# Patient Record
Sex: FEMALE | Race: WHITE | NOT HISPANIC OR LATINO | Employment: STUDENT | URBAN - METROPOLITAN AREA
[De-identification: names, ages, dates, MRNs, and addresses within clinical notes are randomized per-mention and may not be internally consistent; named-entity substitution may affect disease eponyms.]

---
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
## 2021-04-29 ENCOUNTER — PATIENT MESSAGE (OUTPATIENT)
Dept: RESEARCH | Facility: HOSPITAL | Age: 21
End: 2021-04-29

## 2022-08-05 ENCOUNTER — HOSPITAL ENCOUNTER (EMERGENCY)
Facility: HOSPITAL | Age: 22
Discharge: HOME | End: 2022-08-05
Attending: EMERGENCY MEDICINE
Payer: MEDICAID

## 2022-08-05 VITALS
SYSTOLIC BLOOD PRESSURE: 123 MMHG | RESPIRATION RATE: 20 BRPM | TEMPERATURE: 98 F | OXYGEN SATURATION: 98 % | HEART RATE: 89 BPM | WEIGHT: 116.63 LBS | DIASTOLIC BLOOD PRESSURE: 72 MMHG | BODY MASS INDEX: 22.02 KG/M2 | HEIGHT: 61 IN

## 2022-08-05 DIAGNOSIS — E87.5 HYPERKALEMIA: ICD-10-CM

## 2022-08-05 DIAGNOSIS — R89.9 ABNORMAL LABORATORY TEST: Primary | ICD-10-CM

## 2022-08-05 LAB — POTASSIUM SERPL-SCNC: 3.8 MMOL/L (ref 3.5–5.1)

## 2022-08-05 PROCEDURE — 84132 ASSAY OF SERUM POTASSIUM: CPT | Performed by: EMERGENCY MEDICINE

## 2022-08-05 PROCEDURE — 93005 ELECTROCARDIOGRAM TRACING: CPT

## 2022-08-05 PROCEDURE — 99284 EMERGENCY DEPT VISIT MOD MDM: CPT

## 2022-08-05 PROCEDURE — 93010 ELECTROCARDIOGRAM REPORT: CPT | Mod: ,,, | Performed by: INTERNAL MEDICINE

## 2022-08-05 PROCEDURE — 93010 EKG 12-LEAD: ICD-10-PCS | Mod: ,,, | Performed by: INTERNAL MEDICINE

## 2022-08-05 NOTE — ED PROVIDER NOTES
SCRIBE #1 NOTE: I, Nick Mcgrath, am scribing for, and in the presence of, Ian Clark MD. I have scribed the entire note.       History     Chief Complaint   Patient presents with    Abnormal Lab     Critical Potassium of 8.2 per lab results, sent for eval and treat, denies complaint     Review of patient's allergies indicates:  No Known Allergies      History of Present Illness     HPI    8/5/2022, 6:27 PM  History obtained from the patient      History of Present Illness: Dasha Villalobos is a 21 y.o. female patient who presents to the Emergency Department for evaluation of high Potassium level at 8.2 which onset suddenly on Wednesday. Today she got her results back and was sent to the ER. Patient is asymptomatic.  Patient had lab work done for a routine checkup.  Patient denies any CP, weakness, palpitations and all other sxs at this time. No further complaints or concerns at this time.       Arrival mode: Personal vehicle      PCP: Jovanny Mann MD        Past Medical History:  No past medical history on file.    Past Surgical History:  Past Surgical History:   Procedure Laterality Date    NO PAST SURGERIES           Family History:  No family history on file.    Social History:  Social History     Tobacco Use    Smoking status: Never Smoker    Smokeless tobacco: Never Used   Substance and Sexual Activity    Alcohol use: No    Drug use: No    Sexual activity: Not on file        Review of Systems     Review of Systems   Constitutional: Negative for chills and fever.   HENT: Negative for congestion and sore throat.    Eyes: Negative for pain.   Respiratory: Negative for cough and shortness of breath.    Cardiovascular: Negative for chest pain.   Gastrointestinal: Negative for diarrhea, nausea and vomiting.   Genitourinary: Negative for dysuria and flank pain.   Musculoskeletal: Negative for back pain and neck pain.   Skin: Negative for rash.   Neurological: Negative for weakness and numbness.  "  Hematological: Does not bruise/bleed easily.      Physical Exam     Initial Vitals [08/05/22 1755]   BP Pulse Resp Temp SpO2   134/85 90 16 98.2 °F (36.8 °C) 98 %      MAP       --          Physical Exam  Nursing Notes and Vital Signs Reviewed.  Constitutional: Patient is in no apparent distress. Well-developed and well-nourished.  Head: Atraumatic. Normocephalic.  Eyes: PERRL. EOM intact. Conjunctivae are not pale. No scleral icterus.  ENT: Mucous membranes are moist. Oropharynx is clear and symmetric.    Neck: Supple. Full ROM. No lymphadenopathy.  Cardiovascular: Regular rate. Regular rhythm. No murmurs, rubs, or gallops. Distal pulses are 2+ and symmetric.  Pulmonary/Chest: No respiratory distress. Clear to auscultation bilaterally. No wheezing or rales.  Abdominal: Soft and non-distended.  There is no tenderness.  No rebound, guarding, or rigidity. Good bowel sounds.  Genitourinary: No CVA tenderness  Musculoskeletal: Moves all extremities. No obvious deformities. No edema. No calf tenderness.  Skin: Warm and dry.  Neurological:  Alert, awake, and appropriate.  Normal speech.  No acute focal neurological deficits are appreciated.  Psychiatric: Normal affect. Good eye contact. Appropriate in content.     ED Course   Procedures  ED Vital Signs:  Vitals:    08/05/22 1755   BP: 134/85   Pulse: 90   Resp: 16   Temp: 98.2 °F (36.8 °C)   TempSrc: Oral   SpO2: 98%   Weight: 52.9 kg (116 lb 10 oz)   Height: 5' 1" (1.549 m)       Abnormal Lab Results:  Labs Reviewed   POTASSIUM        All Lab Results:  Results for orders placed or performed during the hospital encounter of 08/05/22   Potassium   Result Value Ref Range    Potassium 3.8 3.5 - 5.1 mmol/L       The EKG was ordered, reviewed, and independently interpreted by the ED provider.  Interpretation time: 18:10:44  Rate: 86 BPM  Rhythm: normal sinus rhythm  Interpretation: No acute ST eleations. No STEMI.             The Emergency Provider reviewed the vital signs " and test results, which are outlined above.     ED Discussion     7:21 PM: Reassessed pt at this time.  Discussed with pt all pertinent ED information and results. Discussed pt dx and plan of tx. Gave pt all f/u and return to the ED instructions. All questions and concerns were addressed at this time. Pt expresses understanding of information and instructions, and is comfortable with plan to discharge. Pt is stable for discharge.    I discussed with patient and/or family/caretaker that evaluation in the ED does not suggest any emergent or life threatening medical conditions requiring immediate intervention beyond what was provided in the ED, and I believe patient is safe for discharge.  Regardless, an unremarkable evaluation in the ED does not preclude the development or presence of a serious of life threatening condition. As such, patient was instructed to return immediately for any worsening or change in current symptoms.         Medical Decision Making:   Clinical Tests:   Lab Tests: Ordered and Reviewed  Medical Tests: Ordered and Reviewed  Pseudohyperkalemia.  Patient had routine lab work done and potassium came back elevated..  She is asymptomatic.  Repeat potassium in the emergency department is within normal limits.  Patient discharged home stable asymptomatic condition           ED Medication(s):  Medications - No data to display    New Prescriptions    No medications on file        Follow-up Information     [facility] - Emergency Dept..    Specialty: Emergency Medicine  Why: As needed, If symptoms worsen  Contact information:  [address] [place] [id number] [phone]           Follow-up with primary care physician as needed.                           Scribe Attestation:   Scribe #1: I performed the above scribed service and the documentation accurately describes the services I performed. I attest to the accuracy of the note.     Attending:   Physician Attestation Statement for  Scribe #1: I, Ian Clark MD, personally performed the services described in this documentation, as scribed by Nick Mcgrath, in my presence, and it is both accurate and complete.           Clinical Impression       ICD-10-CM ICD-9-CM   1. Abnormal laboratory test  R89.9 796.4   2. Hyperkalemia  E87.5 276.7       Disposition:   Disposition: Discharged  Condition: Stable         Ian Clark MD  08/08/22 0791

## 2023-01-13 ENCOUNTER — HOSPITAL ENCOUNTER (OUTPATIENT)
Dept: RADIOLOGY | Facility: HOSPITAL | Age: 23
Discharge: HOME | End: 2023-01-13
Attending: NURSE PRACTITIONER
Payer: MEDICAID

## 2023-01-13 DIAGNOSIS — N63.0 BREAST NODULE: ICD-10-CM

## 2023-01-13 PROCEDURE — 76642 US BREAST RIGHT LIMITED: ICD-10-PCS | Mod: 26,RT,, | Performed by: RADIOLOGY

## 2023-01-13 PROCEDURE — 76642 ULTRASOUND BREAST LIMITED: CPT | Mod: TC,RT

## 2023-01-13 PROCEDURE — 76642 ULTRASOUND BREAST LIMITED: CPT | Mod: 26,RT,, | Performed by: RADIOLOGY

## 2023-02-07 ENCOUNTER — HOSPITAL ENCOUNTER (EMERGENCY)
Facility: HOSPITAL | Age: 23
Discharge: HOME | End: 2023-02-07
Attending: EMERGENCY MEDICINE
Payer: MEDICAID

## 2023-02-07 VITALS
HEART RATE: 81 BPM | SYSTOLIC BLOOD PRESSURE: 156 MMHG | WEIGHT: 120.69 LBS | DIASTOLIC BLOOD PRESSURE: 91 MMHG | TEMPERATURE: 99 F | OXYGEN SATURATION: 98 % | RESPIRATION RATE: 20 BRPM | BODY MASS INDEX: 22.78 KG/M2 | HEIGHT: 61 IN

## 2023-02-07 DIAGNOSIS — H65.91 RIGHT NON-SUPPURATIVE OTITIS MEDIA: Primary | ICD-10-CM

## 2023-02-07 DIAGNOSIS — H92.03 EAR PAIN, BILATERAL: ICD-10-CM

## 2023-02-07 PROCEDURE — 99284 EMERGENCY DEPT VISIT MOD MDM: CPT

## 2023-02-07 RX ORDER — CETIRIZINE HYDROCHLORIDE 10 MG/1
10 TABLET ORAL DAILY
Qty: 30 TABLET | Refills: 0 | Status: SHIPPED | OUTPATIENT
Start: 2023-02-07 | End: 2024-02-07

## 2023-02-07 RX ORDER — TRAMADOL HYDROCHLORIDE 50 MG/1
50 TABLET ORAL EVERY 6 HOURS PRN
Qty: 12 TABLET | Refills: 0 | Status: SHIPPED | OUTPATIENT
Start: 2023-02-07

## 2023-02-07 RX ORDER — AMOXICILLIN 500 MG/1
500 CAPSULE ORAL 3 TIMES DAILY
Qty: 21 CAPSULE | Refills: 0 | Status: SHIPPED | OUTPATIENT
Start: 2023-02-07 | End: 2023-02-14

## 2023-02-07 NOTE — ED PROVIDER NOTES
SCRIBE #1 NOTE: I, Denver Estes, am scribing for, and in the presence of, Perfecto Rodriguez MD. I have scribed the entire note.      History      Chief Complaint   Patient presents with    Otalgia     Since October; worsening pain; needs referral for ENT       Review of patient's allergies indicates:  No Known Allergies     HPI   HPI    2/7/2023, 9:33 AM   History obtained from the patient      History of Present Illness: Dasha Villalobos is a 22 y.o. female patient who presents to the Emergency Department for R ear pain, onset 5 months PTA. 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 Pt was unable to get an appointment with her PCP, and was thus referred to the ED for further evaluation. Symptoms are constant and moderate in severity. No mitigating or exacerbating factors reported. No associated sxs reported. Patient denies any fever, chills, n/v/d, SOB, CP, weakness, numbness, dizziness, headache, and all other sxs at this time.   Pt has been treated with 2 rounds of abx over the past 5 months. No further complaints or concerns at this time.     Arrival mode: Personal vehicle    PCP: Jovanny Mann MD       Past Medical History:  History reviewed. No pertinent past medical history.    Past Surgical History:  Past Surgical History:   Procedure Laterality Date    NO PAST SURGERIES           Family History:  Family History   Problem Relation Age of Onset    Diabetes Mother     Hypertension Mother        Social History:  Social History     Tobacco Use    Smoking status: Never    Smokeless tobacco: Never   Substance and Sexual Activity    Alcohol use: No    Drug use: No    Sexual activity: Not on file       ROS   Review of Systems   Constitutional:  Negative for chills and fever.   HENT:  Positive for ear pain (R). Negative for sore throat.    Respiratory:  Negative for shortness of breath.    Cardiovascular:  Negative for chest pain.   Gastrointestinal:  Negative for diarrhea, nausea and vomiting.  "  Genitourinary:  Negative for dysuria.   Musculoskeletal:  Negative for back pain.   Skin:  Negative for rash.   Neurological:  Negative for dizziness, weakness, light-headedness, numbness and headaches.   Hematological:  Does not bruise/bleed easily.   All other systems reviewed and are negative.    Physical Exam      Initial Vitals [02/07/23 0912]   BP Pulse Resp Temp SpO2   (!) 156/91 81 20 98.7 °F (37.1 °C) 98 %      MAP       --          Physical Exam  Nursing Notes and Vital Signs Reviewed.  Constitutional: Patient is in no acute distress. Well-developed and well-nourished.  Head: Atraumatic. Normocephalic.  Eyes: PERRL. EOM intact. Conjunctivae are not pale. No scleral icterus.  ENT: Mucous membranes are moist. Oropharynx is clear and symmetric.  Bilateral TM erythema and bulging, no pus noted.  Neck: Supple. Full ROM. Bilateral anterior cervical lymphadenopathy.  Cardiovascular: Regular rate. Regular rhythm. No murmurs, rubs, or gallops. Distal pulses are 2+ and symmetric.  Pulmonary/Chest: No respiratory distress. Clear to auscultation bilaterally. No wheezing or rales.  Abdominal: Soft and non-distended.  There is no tenderness.  No rebound, guarding, or rigidity.  Musculoskeletal: Moves all extremities. No obvious deformities. No edema.  Skin: Warm and dry.  Neurological:  Alert, awake, and appropriate.  Normal speech.  No acute focal neurological deficits are appreciated.  Psychiatric: Normal affect. Good eye contact. Appropriate in content.    ED Course    Procedures  ED Vital Signs:  Vitals:    02/07/23 0912   BP: (!) 156/91   Pulse: 81   Resp: 20   Temp: 98.7 °F (37.1 °C)   TempSrc: Oral   SpO2: 98%   Weight: 54.8 kg (120 lb 11.2 oz)   Height: 5' 1" (1.549 m)       Abnormal Lab Results:  Labs Reviewed - No data to display     Imaging Results:  Imaging Results    None                 The Emergency Provider reviewed the vital signs and test results, which are outlined above.    ED Discussion     9:36 " AM: Reassessed pt at this time. Discussed with pt all pertinent ED information. Discussed pt dx and plan of tx. Gave pt all f/u and return to the ED instructions. All questions and concerns were addressed at this time. Pt expresses understanding of information and instructions, and is comfortable with plan to discharge. Pt is stable for discharge.    I discussed with patient that evaluation in the ED does not suggest any emergent or life threatening medical conditions requiring immediate intervention beyond what was provided in the ED, and I believe patient is safe for discharge.  Regardless, an unremarkable evaluation in the ED does not preclude the development or presence of a serious of life threatening condition. As such, patient was instructed to return immediately for any worsening or change in current symptoms.         ED Medication(s):  Medications - No data to display     Follow-up Information       Jovanny Mann MD. Schedule an appointment as soon as possible for a visit in 3 days.    Specialty: Family Medicine  Contact information:  [address] [facility]  [facility] [address]  [phone]                           Discharge Medication List as of 2/7/2023  9:41 AM        START taking these medications    Details   amoxicillin (AMOXIL) 500 MG capsule Take 1 capsule (500 mg total) by mouth 3 (three) times daily. for 7 days, Starting Tue 2/7/2023, Until Tue 2/14/2023, Normal      cetirizine (ZYRTEC) 10 MG tablet Take 1 tablet (10 mg total) by mouth once daily., Starting Tue 2/7/2023, Until Wed 2/7/2024, Normal      traMADoL (ULTRAM) 50 mg tablet Take 1 tablet (50 mg total) by mouth every 6 (six) hours as needed for Pain., Starting Tue 2/7/2023, Normal               Medical Decision Making          Medical Decision Making  Problems Addressed:  Right non-suppurative otitis media: undiagnosed new problem with uncertain prognosis    Risk  OTC drugs.  Prescription drug management.          Scribe Attestation:   Scribe #1: I performed the above scribed service and the documentation accurately describes the services I performed. I attest to the accuracy of the note.    Attending:   Physician Attestation Statement for Scribe #1: I, Perfecto Rodriguez MD, personally performed the services described in this documentation, as scribed by Denver Estes, in my presence, and it is both accurate and complete.          Clinical Impression       ICD-10-CM ICD-9-CM   1. Right non-suppurative otitis media  H65.91 381.4   2. Ear pain, bilateral  H92.03 388.70       Disposition:   Disposition: Discharged  Condition: Stable       Perfecto Rodriguez MD  02/07/23 2143       Perfecto Rodriguez MD  02/07/23 2143

## 2023-02-07 NOTE — Clinical Note
"Dasha Mcallister" Griselda was seen and treated in our emergency department on 2/7/2023.  She may return to work on 02/08/2023.       If you have any questions or concerns, please don't hesitate to call.      Perfecto Rodriguez MD"

## 2023-02-07 NOTE — DISCHARGE INSTRUCTIONS
Follow-up with your primary care doctor the next 2-3 days for recheck and possible ear nose and throat doctor referral.  Return to the emergency department for any worsening signs or symptoms.

## 2023-02-13 ENCOUNTER — TELEPHONE (OUTPATIENT)
Dept: OTOLARYNGOLOGY | Facility: CLINIC | Age: 23
End: 2023-02-13
Payer: MEDICAID

## 2023-02-13 NOTE — TELEPHONE ENCOUNTER
----- Message from Bere Sanders sent at 2/13/2023 12:01 PM CST -----  Contact: CANDICE TRIPP [[id number]] [phone]  Type: Appointment Request    Name of Caller: CANDICE TRIPP [[id number]]  When is the first available appointment? Do not have access  Reason for Visit:  Right non-suppurative otitis media [H65.91], referral  Best Call Back Number: [phone]  Additional Information: New Medicaid patient, has referral. Can only do the [facility].

## 2023-07-11 ENCOUNTER — HOSPITAL ENCOUNTER (OUTPATIENT)
Dept: RADIOLOGY | Facility: HOSPITAL | Age: 23
Discharge: HOME | End: 2023-07-11
Attending: NURSE PRACTITIONER
Payer: MEDICAID

## 2023-07-11 DIAGNOSIS — N63.0 BREAST NODULE: ICD-10-CM

## 2023-07-11 PROCEDURE — 76642 US BREAST RIGHT LIMITED: ICD-10-PCS | Mod: 26,RT,, | Performed by: RADIOLOGY

## 2023-07-11 PROCEDURE — 76642 ULTRASOUND BREAST LIMITED: CPT | Mod: 26,RT,, | Performed by: RADIOLOGY

## 2023-07-11 PROCEDURE — 76642 ULTRASOUND BREAST LIMITED: CPT | Mod: TC,RT

## 2024-01-22 ENCOUNTER — HOSPITAL ENCOUNTER (OUTPATIENT)
Dept: RADIOLOGY | Facility: HOSPITAL | Age: 24
Discharge: HOME | End: 2024-01-22
Attending: NURSE PRACTITIONER

## 2024-01-22 VITALS — WEIGHT: 120.81 LBS | BODY MASS INDEX: 22.81 KG/M2 | HEIGHT: 61 IN

## 2024-01-22 DIAGNOSIS — N64.0 FISSURE OF NIPPLE: ICD-10-CM

## 2024-01-22 PROCEDURE — 77061 BREAST TOMOSYNTHESIS UNI: CPT | Mod: TC,RT

## 2024-01-22 PROCEDURE — 76642 ULTRASOUND BREAST LIMITED: CPT | Mod: TC,RT

## 2024-01-22 PROCEDURE — 77065 DX MAMMO INCL CAD UNI: CPT | Mod: 26,RT,, | Performed by: RADIOLOGY

## 2024-01-22 PROCEDURE — 76642 ULTRASOUND BREAST LIMITED: CPT | Mod: 26,RT,, | Performed by: RADIOLOGY

## 2024-01-22 PROCEDURE — 77061 BREAST TOMOSYNTHESIS UNI: CPT | Mod: 26,RT,, | Performed by: RADIOLOGY

## 2024-01-22 PROCEDURE — 77065 DX MAMMO INCL CAD UNI: CPT | Mod: TC,RT

## 2024-04-11 NOTE — ED NOTES
Pt states has had a sore throat and fever with cough for a few days.  Mother at bedside.   Armband checked, patient verified. Verified by spelling and stated name on armband along with .   LOC: The patient is awake, alert and aware of environment with an appropriate affect, the patient is oriented x 3 and speaking appropriately.  APPEARANCE: Patient resting comfortably and in no acute distress, patient is clean and well groomed  SKIN: The skin is warm and dry, color consistent with ethnicity, patient has normal skin turgor and moist mucus membranes, no breakdown or bruising noted.   MUSCULOSKELETAL: Patient moving all extremities to command  RESPIRATORY: Airway is open and patent, respirations are regular, even and non labored.  CARDIAC: Patient has a normal rate, no periphreal edema noted, capillary refill < 3 seconds.  ABDOMEN: Soft and non tender to palpation.    
room air

## 2025-07-09 ENCOUNTER — OFFICE VISIT (OUTPATIENT)
Dept: PRIMARY CARE CLINIC | Facility: CLINIC | Age: 25
End: 2025-07-09
Payer: COMMERCIAL

## 2025-07-09 ENCOUNTER — LAB VISIT (OUTPATIENT)
Dept: LAB | Facility: HOSPITAL | Age: 25
End: 2025-07-09
Attending: NURSE PRACTITIONER
Payer: COMMERCIAL

## 2025-07-09 VITALS
HEART RATE: 99 BPM | OXYGEN SATURATION: 97 % | WEIGHT: 135.56 LBS | BODY MASS INDEX: 25.59 KG/M2 | SYSTOLIC BLOOD PRESSURE: 110 MMHG | TEMPERATURE: 98 F | DIASTOLIC BLOOD PRESSURE: 70 MMHG | HEIGHT: 61 IN

## 2025-07-09 DIAGNOSIS — L70.9 ACNE, UNSPECIFIED ACNE TYPE: ICD-10-CM

## 2025-07-09 DIAGNOSIS — F41.9 ANXIETY: ICD-10-CM

## 2025-07-09 DIAGNOSIS — Z00.00 ENCOUNTER FOR PREVENTATIVE ADULT HEALTH CARE EXAMINATION: Primary | ICD-10-CM

## 2025-07-09 DIAGNOSIS — Z00.00 ENCOUNTER FOR PREVENTATIVE ADULT HEALTH CARE EXAMINATION: ICD-10-CM

## 2025-07-09 PROBLEM — J06.9 UPPER RESPIRATORY TRACT INFECTION: Status: RESOLVED | Noted: 2018-01-11 | Resolved: 2025-07-09

## 2025-07-09 LAB
ABSOLUTE EOSINOPHIL (OHS): 0.15 K/UL
ABSOLUTE MONOCYTE (OHS): 0.5 K/UL (ref 0.3–1)
ABSOLUTE NEUTROPHIL COUNT (OHS): 4.84 K/UL (ref 1.8–7.7)
ALBUMIN SERPL BCP-MCNC: 4.3 G/DL (ref 3.5–5.2)
ALP SERPL-CCNC: 70 UNIT/L (ref 40–150)
ALT SERPL W/O P-5'-P-CCNC: 27 UNIT/L (ref 10–44)
ANION GAP (OHS): 8 MMOL/L (ref 8–16)
AST SERPL-CCNC: 21 UNIT/L (ref 11–45)
BASOPHILS # BLD AUTO: 0.05 K/UL
BASOPHILS NFR BLD AUTO: 0.6 %
BILIRUB SERPL-MCNC: 1.8 MG/DL (ref 0.1–1)
BUN SERPL-MCNC: 16 MG/DL (ref 6–20)
CALCIUM SERPL-MCNC: 9.4 MG/DL (ref 8.7–10.5)
CHLORIDE SERPL-SCNC: 110 MMOL/L (ref 95–110)
CHOLEST SERPL-MCNC: 174 MG/DL (ref 120–199)
CHOLEST/HDLC SERPL: 3 {RATIO} (ref 2–5)
CO2 SERPL-SCNC: 22 MMOL/L (ref 23–29)
CREAT SERPL-MCNC: 0.8 MG/DL (ref 0.5–1.4)
ERYTHROCYTE [DISTWIDTH] IN BLOOD BY AUTOMATED COUNT: 12.2 % (ref 11.5–14.5)
FERRITIN SERPL-MCNC: 92 NG/ML (ref 20–300)
GFR SERPLBLD CREATININE-BSD FMLA CKD-EPI: >60 ML/MIN/1.73/M2
GLUCOSE SERPL-MCNC: 90 MG/DL (ref 70–110)
HCT VFR BLD AUTO: 43.2 % (ref 37–48.5)
HDLC SERPL-MCNC: 58 MG/DL (ref 40–75)
HDLC SERPL: 33.3 % (ref 20–50)
HGB BLD-MCNC: 14.1 GM/DL (ref 12–16)
IMM GRANULOCYTES # BLD AUTO: 0.02 K/UL (ref 0–0.04)
IMM GRANULOCYTES NFR BLD AUTO: 0.3 % (ref 0–0.5)
IRON SATN MFR SERPL: 42 % (ref 20–50)
IRON SERPL-MCNC: 178 UG/DL (ref 30–160)
LDLC SERPL CALC-MCNC: 95.6 MG/DL (ref 63–159)
LYMPHOCYTES # BLD AUTO: 2.14 K/UL (ref 1–4.8)
MCH RBC QN AUTO: 30.9 PG (ref 27–31)
MCHC RBC AUTO-ENTMCNC: 32.6 G/DL (ref 32–36)
MCV RBC AUTO: 95 FL (ref 82–98)
NONHDLC SERPL-MCNC: 116 MG/DL
NUCLEATED RBC (/100WBC) (OHS): 0 /100 WBC
PLATELET # BLD AUTO: 263 K/UL (ref 150–450)
PMV BLD AUTO: 12 FL (ref 9.2–12.9)
POTASSIUM SERPL-SCNC: 4.5 MMOL/L (ref 3.5–5.1)
PROT SERPL-MCNC: 7.8 GM/DL (ref 6–8.4)
RBC # BLD AUTO: 4.57 M/UL (ref 4–5.4)
RELATIVE EOSINOPHIL (OHS): 1.9 %
RELATIVE LYMPHOCYTE (OHS): 27.8 % (ref 18–48)
RELATIVE MONOCYTE (OHS): 6.5 % (ref 4–15)
RELATIVE NEUTROPHIL (OHS): 62.9 % (ref 38–73)
SODIUM SERPL-SCNC: 140 MMOL/L (ref 136–145)
T4 FREE SERPL-MCNC: 1.12 NG/DL (ref 0.71–1.51)
TIBC SERPL-MCNC: 428 UG/DL (ref 250–450)
TRANSFERRIN SERPL-MCNC: 289 MG/DL (ref 200–375)
TRIGL SERPL-MCNC: 102 MG/DL (ref 30–150)
TSH SERPL-ACNC: 2.78 UIU/ML (ref 0.4–4)
WBC # BLD AUTO: 7.7 K/UL (ref 3.9–12.7)

## 2025-07-09 PROCEDURE — 82728 ASSAY OF FERRITIN: CPT

## 2025-07-09 PROCEDURE — 84443 ASSAY THYROID STIM HORMONE: CPT

## 2025-07-09 PROCEDURE — 85025 COMPLETE CBC W/AUTO DIFF WBC: CPT

## 2025-07-09 PROCEDURE — 84439 ASSAY OF FREE THYROXINE: CPT

## 2025-07-09 PROCEDURE — 36415 COLL VENOUS BLD VENIPUNCTURE: CPT | Mod: PN

## 2025-07-09 PROCEDURE — 80053 COMPREHEN METABOLIC PANEL: CPT

## 2025-07-09 PROCEDURE — 83540 ASSAY OF IRON: CPT

## 2025-07-09 PROCEDURE — 80061 LIPID PANEL: CPT

## 2025-07-09 PROCEDURE — 99999 PR PBB SHADOW E&M-EST. PATIENT-LVL III: CPT | Mod: PBBFAC,,, | Performed by: NURSE PRACTITIONER

## 2025-07-09 RX ORDER — METHYLPREDNISOLONE 4 MG/1
TABLET ORAL
COMMUNITY
Start: 2025-06-25 | End: 2025-07-09

## 2025-07-09 RX ORDER — PROPRANOLOL HYDROCHLORIDE 10 MG/1
10 TABLET ORAL 3 TIMES DAILY PRN
Qty: 90 TABLET | Refills: 11 | Status: SHIPPED | OUTPATIENT
Start: 2025-07-09 | End: 2026-07-09

## 2025-07-09 RX ORDER — BENZONATATE 200 MG/1
200 CAPSULE ORAL
COMMUNITY
Start: 2025-06-25 | End: 2025-07-09

## 2025-07-09 RX ORDER — AMOXICILLIN AND CLAVULANATE POTASSIUM 875; 125 MG/1; MG/1
1 TABLET, FILM COATED ORAL EVERY 12 HOURS
COMMUNITY
Start: 2025-02-27 | End: 2025-07-09

## 2025-07-09 RX ORDER — PREDNISONE 20 MG/1
20 TABLET ORAL
COMMUNITY
Start: 2025-02-27 | End: 2025-07-09

## 2025-07-09 NOTE — PROGRESS NOTES
Patient ID: Dasha Villalobos is a 24 y.o. female.    Chief Complaint: Establish Care and Annual Exam    History of Present Illness    Ms. Villalobos presents today to [facility].    She reports persistent cough with mucus production since October. She was previously treated with antibiotics in February. Two weeks ago, she visited urgent care and was prescribed Tessalon Perles and a steroid packet, which she has completed. Her lungs were checked and found to be clear.    She experiences daily headaches occurring consistently from 3 PM until end of day, with today's headache occurring outside this typical pattern. Over-the-counter Tylenol and ibuprofen have been ineffective in providing relief. She suspects possible correlation between headaches and menstrual cycle, and considers vision as a potential contributing factor.    She experiences persistent anxiety, particularly when traveling alone. She reports feeling stressed consistently and notes elevated blood pressure during anxiety-provoking situations. She has never received treatment for anxiety. While hesitant about medication, she is open to treatment options. During anxious episodes, she experiences increased heart rate and perceived blood pressure changes.    She reports episodes of intense heartbeat occurring approximately every six months, associated with near-fainting sensation. She denies concurrent palpitations, dizziness, shortness of breath, or other cardiac symptoms.    She reports regular monthly periods with extended duration and normal flow intensity. Most recent menstrual period ended yesterday. She is not currently using contraception but is considering restarting hormonal birth control. She does not have an established gynecologist.    She reports persistent fatigue throughout the day without clear etiology.    She reports visual impairment and had an eye exam two months ago. She currently wears new contact lenses, which she suspects  might be dry.    She has a history of hyperthyroidism diagnosed at age 14.    Family history is significant for diabetes and hypertension on both maternal and paternal sides. No family history of thyroid disease.    Previous medications include Spironolactone for acne (discontinued due to insurance),Tretinoin for acne, and Zyrtec for allergies (not currently using). She denies caffeine consumption.      ROS:  General: -fever, -chills, +fatigue, -weight gain, -weight loss  Eyes: -vision changes, -redness, -discharge  ENT: -ear pain, -nasal congestion, -sore throat  Cardiovascular: -chest pain, +palpitations, -lower extremity edema, +feelings of fast heart rate  Respiratory: -cough, -shortness of breath, +productive cough  Gastrointestinal: -abdominal pain, -nausea, -vomiting, -diarrhea, -constipation, -blood in stool  Genitourinary: -dysuria, -hematuria, -frequency  Musculoskeletal: -joint pain, -muscle pain  Skin: -rash, -lesion  Neurological: +headache, -dizziness, -numbness, -tingling  Psychiatric: +anxiety, -depression, -sleep difficulty  Female Genitourinary: +periods lasting more than 7 days         Wt Readings from Last 10 Encounters:   07/09/25 61.5 kg (135 lb 9.3 oz)   01/22/24 54.8 kg (120 lb 13 oz)   02/07/23 54.8 kg (120 lb 11.2 oz)   08/05/22 52.9 kg (116 lb 10 oz)   08/03/22 52.2 kg (115 lb)   08/18/21 53.9 kg (118 lb 12.8 oz)   04/14/21 51.8 kg (114 lb 3.2 oz)   03/04/21 56.3 kg (124 lb 3.2 oz)   05/01/18 52.3 kg (115 lb 6 oz)   04/09/18 51.7 kg (114 lb)       The ASCVD Risk score (Anastacia DK, et al., 2019) failed to calculate for the following reasons:    The 2019 ASCVD risk score is only valid for ages 40 to 79    PAST MEDICAL HISTORY:  History reviewed. No pertinent past medical history.    PAST SURGICAL HISTORY:  Past Surgical History:   Procedure Laterality Date    NO PAST SURGERIES         SOCIAL HISTORY:  Social History[1]    FAMILY HISTORY:  Family History   Problem Relation Name Age of Onset     "Diabetes Mother      Hypertension Mother         ALLERGIES AND MEDICATIONS: updated and reviewed.  Review of patient's allergies indicates:  No Known Allergies  Current Medications[2]      Physical Exam  Constitutional:       Appearance: Normal appearance. She is normal weight.   HENT:      Head: Normocephalic and atraumatic.      Right Ear: Tympanic membrane normal.      Left Ear: Tympanic membrane normal.      Nose: Nose normal.      Mouth/Throat:      Mouth: Mucous membranes are moist.   Eyes:      Pupils: Pupils are equal, round, and reactive to light.   Cardiovascular:      Rate and Rhythm: Normal rate.      Pulses: Normal pulses.   Abdominal:      General: Bowel sounds are normal.   Musculoskeletal:      Cervical back: Normal range of motion.   Skin:     General: Skin is warm and dry.      Capillary Refill: Capillary refill takes less than 2 seconds.   Neurological:      Mental Status: She is oriented to person, place, and time.   Psychiatric:         Mood and Affect: Mood is anxious.          Assessment:   LABS:   No results found for: "HGBA1C"   Lab Results   Component Value Date    CHOL 175 08/03/2022    CHOL 120 03/05/2021     Lab Results   Component Value Date    LDLCALC 97 08/03/2022    LDLCALC 45 03/05/2021     Lab Results   Component Value Date    WBC 9.7 08/03/2022    HGB 13.7 08/03/2022    HCT 41.5 08/03/2022     08/03/2022    CHOL 175 08/03/2022    TRIG 59 08/03/2022    HDL 67 08/03/2022    ALT 19 08/03/2022    AST 23 08/03/2022     08/03/2022    K 3.8 08/05/2022     08/03/2022    CREATININE 0.71 08/03/2022    BUN 11 08/03/2022    CO2 20 08/03/2022    TSH 2.770 08/03/2022       Plan:   Dasha was seen today for establish care and annual exam.    Diagnoses and all orders for this visit:    Encounter for preventative adult health care examination  -     CBC Auto Differential; Future  -     Comprehensive Metabolic Panel; Future  -     Iron and TIBC; Future  -     TSH; Future  -     " T4, Free; Future  -     Lipid Panel; Future  -     Ferritin; Future    Acne, unspecified acne type  -     CBC Auto Differential; Future  -     Comprehensive Metabolic Panel; Future  -     Iron and TIBC; Future  -     TSH; Future  -     T4, Free; Future  -     Lipid Panel; Future  -     Ferritin; Future    Anxiety  -     CBC Auto Differential; Future  -     Comprehensive Metabolic Panel; Future  -     Iron and TIBC; Future  -     TSH; Future  -     T4, Free; Future  -     Lipid Panel; Future  -     Ferritin; Future    Other orders  -     propranoloL (INDERAL) 10 MG tablet; Take 1 tablet (10 mg total) by mouth 3 (three) times daily as needed.        Assessment & Plan      IMPRESSION:  -Assessed elevated heart rate (99 bpm) and headaches, considering anxiety as potential cause.  -Evaluated possibility of low iron contributing to headaches, especially given reported long menstrual cycles.  -Considered thyroid function as potential factor in elevated heart rate and anxiety symptoms.    GENERALIZED ANXIETY DISORDER:  - Discussed relationship between elevated heart rate (measured at 99 bpm) and anxiety symptoms.  - Explained that normal resting heart rate for young adults should be closer to 60 bpm.  - Prescribed propranolol (Inderal) 10 mg, 3 times daily as needed to address elevated heart rate, which may indirectly help anxiety symptoms.  - Clarified that propranolol is not an anti-anxiety medication but works to lower heart rate.  - Will monitor response and consider further treatment if ineffective.      HEADACHE:  - Ms. Villalobos experiences daily headaches, typically in the afternoon, and had one during today's visit.  - Discussed possible relationship between headaches and anxiety or low iron levels.  - Instructed patient to check heart rate when experiencing headaches.  - The prescribed propranolol for anxiety may also help with headaches.  - Ordered iron levels and thyroid function tests to evaluate potential  causes.    PALPITATIONS:  - Ms. Villalobos reports rare episodes of racing heartbeat, occurring approximately once every 6 months.  - The propranolol prescribed for anxiety will also address these palpitations.    FATIGUE:  - Ms. Vilallobos reports frequent fatigue, which may be related to low iron levels.  - Ordered CBC with iron and ferritin to evaluate potential causes.    VISION CONCERNS:  - Ms. Villalobos mentions vision concerns despite recent eye exam 2 months ago.    PERSONAL HISTORY OF ENDOCRINE DISEASE:  - Ms. Villalobos reports possible Thyroid disease as an adolescent  - Ordered thyroid panel as part of current evaluation.    FAMILY HISTORY OF DIABETES:  - Ms. Villalobos has family history of diabetes on both maternal and paternal sides.  - Ordered glucose screening as part of evaluation.      FOLLOW-UP:  1. Fasting labs at [facility] today.  2. VV with RAPHAEL Michaels in a week for lab review/Anxiety.  3. Schedule Est Care visit with GYN (Dolly or Maria Guadalupe) at [facility] pending patient's availability.      I spent a total of 41 minutes face to face and non-face to face on the date of this visit.This includes time preparing to see the patient (eg, review of tests, notes), obtaining and/or reviewing additional history from an independent historian and/or outside medical records, documenting clinical information in the electronic health record, independently interpreting results and/or communicating results to the patient/family/caregiver, or care coordinator.  Visit today included increased complexity associated with the care of the episodic problem addressed and managing the longitudinal care of the patient due to the serious and/or complex managed problem(s).      This note was generated with the assistance of ambient listening technology. Verbal consent was obtained by the patient and accompanying visitor(s) for the recording of patient appointment to facilitate this note. I attest to having reviewed and edited the generated  note for accuracy, though some syntax or spelling errors may persist. Please contact the author of this note for any clarification.        Sincerely,  Brando Vidales NP          [1]   Social History  Socioeconomic History    Marital status: Single   Tobacco Use    Smoking status: Never    Smokeless tobacco: Never   Substance and Sexual Activity    Alcohol use: No    Drug use: No     Social Drivers of Health     Financial Resource Strain: Low Risk  (7/2/2025)    Overall Financial Resource Strain (CARDIA)     Difficulty of Paying Living Expenses: Not very hard   Food Insecurity: No Food Insecurity (7/2/2025)    Hunger Vital Sign     Worried About Running Out of Food in the Last Year: Never true     Ran Out of Food in the Last Year: Never true   Transportation Needs: No Transportation Needs (7/2/2025)    PRAPARE - Transportation     Lack of Transportation (Medical): No     Lack of Transportation (Non-Medical): No   Physical Activity: Inactive (7/2/2025)    Exercise Vital Sign     Days of Exercise per Week: 0 days     Minutes of Exercise per Session: 0 min   Stress: Stress Concern Present (7/2/2025)    [employer] - Occupational Stress Questionnaire     Feeling of Stress : To some extent   Housing Stability: Low Risk  (7/2/2025)    Housing Stability Vital Sign     Unable to Pay for Housing in the Last Year: No     Number of Times Moved in the Last Year: 1     Homeless in the Last Year: No   [2]   Current Outpatient Medications   Medication Sig Dispense Refill    cetirizine (ZYRTEC) 10 MG tablet Take 1 tablet (10 mg total) by mouth once daily. 30 tablet 0    norethindrone-ethinyl estradiol (VYFEMLA, 28,) 0.4-35 mg-mcg per tablet Take 1 tablet by mouth once daily. 84 tablet 2    propranoloL (INDERAL) 10 MG tablet Take 1 tablet (10 mg total) by mouth 3 (three) times daily as needed. 90 tablet 11    spironolactone (ALDACTONE) 100 MG tablet Take 100 mg by mouth once daily.      tretinoin (RETIN-A)  0.025 % cream Apply topically.       No current facility-administered medications for this visit.

## 2025-07-16 ENCOUNTER — OFFICE VISIT (OUTPATIENT)
Dept: PRIMARY CARE CLINIC | Facility: CLINIC | Age: 25
End: 2025-07-16
Payer: COMMERCIAL

## 2025-07-16 VITALS — BODY MASS INDEX: 25.49 KG/M2 | WEIGHT: 135 LBS | HEIGHT: 61 IN

## 2025-07-16 DIAGNOSIS — R51.9 GENERALIZED HEADACHES: Primary | ICD-10-CM

## 2025-07-16 DIAGNOSIS — F41.9 ANXIETY: ICD-10-CM

## 2025-07-16 PROCEDURE — 98004 SYNCH AUDIO-VIDEO EST SF 10: CPT | Mod: 95,,, | Performed by: NURSE PRACTITIONER

## 2025-07-16 PROCEDURE — 1159F MED LIST DOCD IN RCRD: CPT | Mod: CPTII,95,, | Performed by: NURSE PRACTITIONER

## 2025-07-16 PROCEDURE — G2211 COMPLEX E/M VISIT ADD ON: HCPCS | Mod: 95,,, | Performed by: NURSE PRACTITIONER

## 2025-07-16 PROCEDURE — 1160F RVW MEDS BY RX/DR IN RCRD: CPT | Mod: CPTII,95,, | Performed by: NURSE PRACTITIONER

## 2025-07-16 NOTE — PROGRESS NOTES
Patient ID: Dasha Villalobos is a 24 y.o. female.    Chief Complaint: Follow-up (Lab review and anxiety )    History of Present Illness    Ms. Villalobos presents today for lab review.    She reports experiencing a 10-hour headache at last visit that was unresponsive to Advil but improved with propranolol. She has had a few mild headaches between visits that improved quickly when medication was taken at onset of tension. She describes associated jaw pain during periods of stress, which she believes may trigger her headaches. She acknowledges that stress and anxiety contribute to her symptoms, particularly noting increased heart rate and muscle tension. She has been manually checking her heart rate 5-6 times in the past week, with readings consistently in the high 80s.    She takes propranolol 3 times daily as needed for anxiety with reported effectiveness in preventing headache progression. She previously took spironolactone and aspirin but discontinued 1.5 years ago due to insurance changes. She expresses desire to restart birth control, which was also discontinued due to insurance changes.    HDL cholesterol 58, LDL cholesterol less than 100. Iron level is slightly elevated. Total Bilirubin is elevated. CO2 levels suggest potential mild dehydration. Thyroid function normal.      ROS:  General: -fever, -chills, -fatigue, -weight gain, -weight loss, +cold intolerance  Eyes: -vision changes, -redness, -discharge  ENT: -ear pain, -nasal congestion, -sore throat  Cardiovascular: -chest pain, -palpitations, -lower extremity edema, +feelings of fast heart rate  Respiratory: -cough, -shortness of breath  Gastrointestinal: -abdominal pain, -nausea, -vomiting, -diarrhea, -constipation, -blood in stool  Genitourinary: -dysuria, -hematuria, -frequency  Musculoskeletal: -joint pain, -muscle pain, +facial pain  Skin: -rash, -lesion  Neurological: +headache, -dizziness, -numbness, -tingling  Psychiatric: +anxiety,  "-depression, -sleep difficulty         Wt Readings from Last 10 Encounters:   07/16/25 61.2 kg (135 lb)   07/09/25 61.5 kg (135 lb 9.3 oz)   01/22/24 54.8 kg (120 lb 13 oz)   02/07/23 54.8 kg (120 lb 11.2 oz)   08/05/22 52.9 kg (116 lb 10 oz)   08/03/22 52.2 kg (115 lb)   08/18/21 53.9 kg (118 lb 12.8 oz)   04/14/21 51.8 kg (114 lb 3.2 oz)   03/04/21 56.3 kg (124 lb 3.2 oz)   05/01/18 52.3 kg (115 lb 6 oz)       The ASCVD Risk score (Anastacia POSADAS, et al., 2019) failed to calculate for the following reasons:    The 2019 ASCVD risk score is only valid for ages 40 to 79    PAST MEDICAL HISTORY:  History reviewed. No pertinent past medical history.    PAST SURGICAL HISTORY:  Past Surgical History:   Procedure Laterality Date    NO PAST SURGERIES         SOCIAL HISTORY:  Social History[1]    FAMILY HISTORY:  Family History   Problem Relation Name Age of Onset    Diabetes Mother      Hypertension Mother         ALLERGIES AND MEDICATIONS: updated and reviewed.  Review of patient's allergies indicates:  No Known Allergies  Current Medications[2]      Physical Exam  Constitutional:       Appearance: Normal appearance.   HENT:      Head: Normocephalic and atraumatic.   Neurological:      Mental Status: She is alert and oriented to person, place, and time.   Psychiatric:         Mood and Affect: Mood normal.          Assessment:   LABS:   No results found for: "HGBA1C"   Lab Results   Component Value Date    CHOL 174 07/09/2025    CHOL 175 08/03/2022    CHOL 120 03/05/2021     Lab Results   Component Value Date    LDLCALC 95.6 07/09/2025    LDLCALC 97 08/03/2022    LDLCALC 45 03/05/2021     Lab Results   Component Value Date    WBC 7.70 07/09/2025    HGB 14.1 07/09/2025    HCT 43.2 07/09/2025     07/09/2025    CHOL 174 07/09/2025    TRIG 102 07/09/2025    HDL 58 07/09/2025    ALT 27 07/09/2025    AST 21 07/09/2025     07/09/2025    K 4.5 07/09/2025     07/09/2025    CREATININE 0.8 07/09/2025    BUN 16 " "07/09/2025    CO2 22 (L) 07/09/2025    TSH 2.777 07/09/2025       Plan:   Dasha was seen today for follow-up.    Diagnoses and all orders for this visit:    Generalized headaches    Anxiety        Assessment & Plan      IMPRESSION:  -Reviewed lab results, noting overall good thyroid function, cholesterol levels, and metabolic function.  -Noted elevated iron levels, not currently concerning but warrants monitoring.  -Assessed elevated total bilirubin, likely due to high iron levels and increased liver activity.  -Evaluated effectiveness of propranolol (Inderal) for anxiety and headache management.  -Considered potential hormonal influence on symptoms, including elevated heart rate and headaches.  -Discussed possibility of TMJ contributing to headaches.    GENERALIZED ANXIETY DISORDER:  - Explained the relationship between anxiety, elevated heart rate, and headaches.  - Continued propranolol (Inderal) 10 mg as needed for anxiety and headaches.  - Recommend taking medication as a "vitamin" in the morning due to low dosage.  - Discussed potential hormone-related causes for anxiety symptoms.  - Considered daily dosing if symptoms persist.  - Instructed the patient to monitor heart rate, aiming for closer to 60 bpm at rest, and check pulse rate in the morning.  - Advised taking propranolol when heart rate is in 80s or 90s, even without headache symptoms.    TENSION-TYPE HEADACHE:  - Ms. Villalobos is experiencing mild headaches and jaw pain related to stress.  - Observed that patient takes Inderal as needed to prevent headache progression.  - Evaluated that headaches are likely related to increased heart rate and stress, possibly hormone-driven.  - Continued propranolol (Inderal) 10 mg as needed for headache management.    TACHYCARDIA:  - Evaluated the patient's heart rate, which was 99 during the last visit, indicating anxiety-related tachycardia.  - Noted the patient monitors pulse rate manually, reporting rates around " 85 to high 80s.  - Discussed tachycardia management with Inderal and potential hormone-related causes for elevated heart rate.  - Considered daily dosing if symptoms persist.    DEHYDRATION:  - Evaluated CO2 levels which indicate possible dehydration.  - Advised the patient to increase water intake.    ABNORMAL BLOOD CHEMISTRY FINDINGS:  - Monitored Total Bilirubin which is elevated, possibly due to high iron levels.      FOLLOW-UP:  - VV with Dr. Foster in three months to Est Care/Headaches.      During this audiovisual appt patient appears to be at home.    Each patient to whom he or she provides medical services by telemedicine is:  (1) informed of the relationship between the health care provider and patient and the respective role of any other health care provider with respect to management of the patient; and (2) notified that he or she may decline to receive medical services by telemedicine and may withdraw from such care at any time.    I spent a total of 15 minutes face to face and non-face to face on the date of this visit.This includes time preparing to see the patient (eg, review of tests, notes), obtaining and/or reviewing additional history from an independent historian and/or outside medical records, documenting clinical information in the electronic health record, independently interpreting results and/or communicating results to the patient/family/caregiver, or care coordinator.  Visit today included increased complexity associated with the care of the episodic problem addressed and managing the longitudinal care of the patient due to the serious and/or complex managed problem(s).      This note was generated with the assistance of ambient listening technology. Verbal consent was obtained by the patient and accompanying visitor(s) for the recording of patient appointment to facilitate this note. I attest to having reviewed and edited the generated note for accuracy, though some syntax or spelling  errors may persist. Please contact the author of this note for any clarification.      Sincerely,  Brando Vidales NP   Answers submitted by the patient for this visit:  Review of Systems Questionnaire (Submitted on 7/15/2025)  activity change: No  unexpected weight change: No  neck pain: No  hearing loss: No  rhinorrhea: No  trouble swallowing: No  eye discharge: No  visual disturbance: No  chest tightness: Yes  wheezing: No  chest pain: No  palpitations: Yes  blood in stool: Yes  constipation: No  vomiting: No  diarrhea: No  polydipsia: No  polyuria: No  difficulty urinating: No  hematuria: No  menstrual problem: No  dysuria: No  joint swelling: No  arthralgias: No  headaches: Yes  weakness: No  confusion: No  dysphoric mood: No         [1]   Social History  Socioeconomic History    Marital status: Single   Tobacco Use    Smoking status: Never     Passive exposure: Never    Smokeless tobacco: Never   Substance and Sexual Activity    Alcohol use: No    Drug use: No     Social Drivers of Health     Financial Resource Strain: Low Risk  (7/2/2025)    Overall Financial Resource Strain (CARDIA)     Difficulty of Paying Living Expenses: Not very hard   Food Insecurity: No Food Insecurity (7/2/2025)    Hunger Vital Sign     Worried About Running Out of Food in the Last Year: Never true     Ran Out of Food in the Last Year: Never true   Transportation Needs: No Transportation Needs (7/2/2025)    PRAPARE - Transportation     Lack of Transportation (Medical): No     Lack of Transportation (Non-Medical): No   Physical Activity: Inactive (7/2/2025)    Exercise Vital Sign     Days of Exercise per Week: 0 days     Minutes of Exercise per Session: 0 min   Stress: Stress Concern Present (7/2/2025)    [employer] [facility] [employer] [facility] - Occupational Stress Questionnaire     Feeling of Stress : To some extent   Housing Stability: Low Risk  (7/2/2025)    Housing Stability Vital Sign     Unable to Pay for Housing in the Last  Year: No     Number of Times Moved in the Last Year: 1     Homeless in the Last Year: No   [2]   Current Outpatient Medications   Medication Sig Dispense Refill    norethindrone-ethinyl estradiol (VYFEMLA, 28,) 0.4-35 mg-mcg per tablet Take 1 tablet by mouth once daily. 84 tablet 2    propranoloL (INDERAL) 10 MG tablet Take 1 tablet (10 mg total) by mouth 3 (three) times daily as needed. 90 tablet 11    spironolactone (ALDACTONE) 100 MG tablet Take 100 mg by mouth once daily.      tretinoin (RETIN-A) 0.025 % cream Apply topically.       No current facility-administered medications for this visit.

## 2025-08-08 ENCOUNTER — OFFICE VISIT (OUTPATIENT)
Dept: OBSTETRICS AND GYNECOLOGY | Facility: CLINIC | Age: 25
End: 2025-08-08
Payer: COMMERCIAL

## 2025-08-08 VITALS
BODY MASS INDEX: 26 KG/M2 | DIASTOLIC BLOOD PRESSURE: 74 MMHG | SYSTOLIC BLOOD PRESSURE: 120 MMHG | WEIGHT: 137.69 LBS | HEIGHT: 61 IN

## 2025-08-08 DIAGNOSIS — Z86.19 HISTORY OF HPV INFECTION: ICD-10-CM

## 2025-08-08 DIAGNOSIS — N63.14 MASS OF LOWER INNER QUADRANT OF RIGHT BREAST: ICD-10-CM

## 2025-08-08 DIAGNOSIS — Z01.419 ENCOUNTER FOR GYNECOLOGICAL EXAMINATION WITHOUT ABNORMAL FINDING: Primary | ICD-10-CM

## 2025-08-08 DIAGNOSIS — Z30.09 ENCOUNTER FOR OTHER GENERAL COUNSELING OR ADVICE ON CONTRACEPTION: ICD-10-CM

## 2025-08-08 PROCEDURE — 99999 PR PBB SHADOW E&M-EST. PATIENT-LVL III: CPT | Mod: PBBFAC,,, | Performed by: NURSE PRACTITIONER

## 2025-08-08 RX ORDER — NORGESTIMATE AND ETHINYL ESTRADIOL 7DAYSX3 28
1 KIT ORAL DAILY
Qty: 84 TABLET | Refills: 3 | Status: SHIPPED | OUTPATIENT
Start: 2025-08-08 | End: 2026-08-08

## 2025-08-08 NOTE — PROGRESS NOTES
"CC: Well woman exam    Dasha Villalobos is a 24 y.o. female  presents for well woman exam.  LMP: Patient's last menstrual period was 2025 (approximate)..    Last pap in  - ASCUS with positive HPV - did not specify strain  No pap since  Has had HPV vaccine   Was on ocp but off due to no coverage wants to resume - was on sprintec and worked well for her - just had cycle can start   Has hx of right inner breast mass - las u/s in 2024 showed was 2.9x1.3x4.0 cm and was read as benign but with no f/u  Pt states is still there may have enlarged some no pain, no breast cancer history     Health Maintenance Topics with due status: Not Due       Topic Last Completion Date    Influenza Vaccine 2021    TETANUS VACCINE 2021    RSV Vaccine (Age 60+ and Pregnant patients) Not Due     History reviewed. No pertinent past medical history.  Past Surgical History:   Procedure Laterality Date    NO PAST SURGERIES       Social History[1]  Family History   Problem Relation Name Age of Onset    Cancer Maternal Grandmother      Diabetes Mother      Hypertension Mother      Breast cancer Neg Hx      Colon cancer Neg Hx      Ovarian cancer Neg Hx      Uterine cancer Neg Hx      Cervical cancer Neg Hx       OB History          0    Para   0    Term   0       0    AB   0    Living   0         SAB   0    IAB   0    Ectopic   0    Multiple   0    Live Births   0                 /74   Ht 5' 1" (1.549 m)   Wt 62.5 kg (137 lb 10.8 oz)   LMP 2025 (Approximate)   BMI 26.01 kg/m²       ROS:  Per hpi    PHYSICAL EXAM:  APPEARANCE: Well nourished, well developed, in no acute distress.  AFFECT: WNL, alert and oriented x 3  SKIN: No acne or hirsutism  NECK: Neck symmetric without masses or thyromegaly  NODES: No inguinal, cervical, axillary, or femoral lymph node enlargement  CHEST: Good respiratory effect  ABDOMEN: Soft.  No tenderness or masses.  No hepatosplenomegaly.  No " hernias.  BREASTS: Symmetrical, no skin changes or visible lesions.  No nipple discharge bilaterally. Has mass about 3 x 2 cm to inner aspect of right breast, soft and lobular feels like lipoma   PELVIC: Normal external genitalia without lesions.  Normal hair distribution.  Adequate perineal body, normal urethral meatus.  Vagina moist and well rugated without lesions or discharge.  Cervix pink, without lesions, discharge or tenderness.  No significant cystocele or rectocele.  Bimanual exam shows uterus to be normal size, regular, mobile and nontender.  Adnexa without masses or tenderness.    EXTREMITIES: No edema.  Physical Exam    1. Encounter for gynecological examination without abnormal finding  Liquid-Based Pap Smear, Screening      2. Mass of lower inner quadrant of right breast  US Breast Right Limited      3. Encounter for other general counseling or advice on contraception  norgestimate-ethinyl estradioL (ORTHO TRI-CYCLEN,TRI-SPRINTEC) 0.18/0.215/0.25 mg-0.035mg (28) tablet       AND PLAN:    Dasha was seen today for well woman.    Diagnoses and all orders for this visit:    Encounter for gynecological examination without abnormal finding  -     Liquid-Based Pap Smear, Screening    Mass of lower inner quadrant of right breast  -     US Breast Right Limited; Future    Encounter for other general counseling or advice on contraception  -     norgestimate-ethinyl estradioL (ORTHO TRI-CYCLEN,TRI-SPRINTEC) 0.18/0.215/0.25 mg-0.035mg (28) tablet; Take 1 tablet by mouth once daily.       Since mass may have increased in size pt to see breast specialist to monitor especially restarting ocp use      Patient was counseled today on A.C.S. Pap guidelines and recommendations for yearly pelvic exams, mammograms and monthly self breast exams; to see her PCP for other health maintenance.                     Answers submitted by the patient for this visit:  Gynecologic Exam Questionnaire  (Submitted on 8/7/2025)  Chief  Complaint: Gynecologic exam  genital itching: No  genital lesions: No  genital odor: No  genital rash: No  missed menses: No  pelvic pain: No  vaginal bleeding: No  vaginal discharge: No  Pregnant now?: No  Sexual activity: sexually active  Birth control: condoms  STD: Yes         [1]   Social History  Socioeconomic History    Marital status: Single   Tobacco Use    Smoking status: Never     Passive exposure: Never    Smokeless tobacco: Never   Substance and Sexual Activity    Alcohol use: Yes     Comment: occassionally    Drug use: No    Sexual activity: Yes     Partners: Male     Birth control/protection: None, Condom     Social Drivers of Health     Financial Resource Strain: Low Risk  (7/2/2025)    Overall Financial Resource Strain (CARDIA)     Difficulty of Paying Living Expenses: Not very hard   Food Insecurity: No Food Insecurity (7/2/2025)    Hunger Vital Sign     Worried About Running Out of Food in the Last Year: Never true     Ran Out of Food in the Last Year: Never true   Transportation Needs: No Transportation Needs (7/2/2025)    PRAPARE - Transportation     Lack of Transportation (Medical): No     Lack of Transportation (Non-Medical): No   Physical Activity: Inactive (7/2/2025)    Exercise Vital Sign     Days of Exercise per Week: 0 days     Minutes of Exercise per Session: 0 min   Stress: Stress Concern Present (7/2/2025)    [employer] - Occupational Stress Questionnaire     Feeling of Stress : To some extent   Housing Stability: Low Risk  (7/2/2025)    Housing Stability Vital Sign     Unable to Pay for Housing in the Last Year: No     Number of Times Moved in the Last Year: 1     Homeless in the Last Year: No

## 2025-08-15 ENCOUNTER — HOSPITAL ENCOUNTER (OUTPATIENT)
Dept: RADIOLOGY | Facility: HOSPITAL | Age: 25
Discharge: HOME | End: 2025-08-15
Attending: NURSE PRACTITIONER
Payer: COMMERCIAL

## 2025-08-15 DIAGNOSIS — N63.14 MASS OF LOWER INNER QUADRANT OF RIGHT BREAST: ICD-10-CM

## 2025-08-15 PROCEDURE — 76642 ULTRASOUND BREAST LIMITED: CPT | Mod: TC,RT

## 2025-08-15 PROCEDURE — 76642 ULTRASOUND BREAST LIMITED: CPT | Mod: 26,RT,, | Performed by: RADIOLOGY
